# Patient Record
Sex: MALE | Race: BLACK OR AFRICAN AMERICAN | NOT HISPANIC OR LATINO | Employment: STUDENT | URBAN - METROPOLITAN AREA
[De-identification: names, ages, dates, MRNs, and addresses within clinical notes are randomized per-mention and may not be internally consistent; named-entity substitution may affect disease eponyms.]

---

## 2024-07-01 ENCOUNTER — HOSPITAL ENCOUNTER (EMERGENCY)
Facility: HOSPITAL | Age: 24
Discharge: HOME | End: 2024-07-01
Attending: STUDENT IN AN ORGANIZED HEALTH CARE EDUCATION/TRAINING PROGRAM

## 2024-07-01 VITALS
HEART RATE: 80 BPM | WEIGHT: 127 LBS | OXYGEN SATURATION: 99 % | RESPIRATION RATE: 15 BRPM | DIASTOLIC BLOOD PRESSURE: 80 MMHG | TEMPERATURE: 99 F | SYSTOLIC BLOOD PRESSURE: 125 MMHG

## 2024-07-01 DIAGNOSIS — J10.1 INFLUENZA A: Primary | ICD-10-CM

## 2024-07-01 LAB
HCV AB SERPL QL IA: NORMAL
HIV 1+2 AB+HIV1 P24 AG SERPL QL IA: NORMAL
INFLUENZA A, MOLECULAR: POSITIVE
INFLUENZA B, MOLECULAR: NEGATIVE
SARS-COV-2 RDRP RESP QL NAA+PROBE: NEGATIVE
SPECIMEN SOURCE: ABNORMAL

## 2024-07-01 PROCEDURE — 99283 EMERGENCY DEPT VISIT LOW MDM: CPT

## 2024-07-01 PROCEDURE — U0002 COVID-19 LAB TEST NON-CDC: HCPCS | Performed by: EMERGENCY MEDICINE

## 2024-07-01 PROCEDURE — 86803 HEPATITIS C AB TEST: CPT | Performed by: PHYSICIAN ASSISTANT

## 2024-07-01 PROCEDURE — 87389 HIV-1 AG W/HIV-1&-2 AB AG IA: CPT | Performed by: PHYSICIAN ASSISTANT

## 2024-07-01 PROCEDURE — 87502 INFLUENZA DNA AMP PROBE: CPT | Performed by: PHYSICIAN ASSISTANT

## 2024-07-01 PROCEDURE — 80047 BASIC METABLC PNL IONIZED CA: CPT

## 2024-07-01 RX ORDER — OSELTAMIVIR PHOSPHATE 75 MG/1
75 CAPSULE ORAL 2 TIMES DAILY
Qty: 10 CAPSULE | Refills: 0 | Status: SHIPPED | OUTPATIENT
Start: 2024-07-01 | End: 2024-07-06

## 2024-07-01 NOTE — Clinical Note
"Cameron Dueñas" Magy was seen and treated in our emergency department on 7/1/2024.  He may return to work on 07/08/2024.       If you have any questions or concerns, please don't hesitate to call.      Geoff Romero PA-C"

## 2024-07-02 LAB
BUN SERPL-MCNC: 8 MG/DL (ref 6–30)
CHLORIDE SERPL-SCNC: 98 MMOL/L (ref 95–110)
CREAT SERPL-MCNC: 1.1 MG/DL (ref 0.5–1.4)
GLUCOSE SERPL-MCNC: 117 MG/DL (ref 70–110)
HCT VFR BLD CALC: 42 %PCV (ref 36–54)
POC IONIZED CALCIUM: 1.14 MMOL/L (ref 1.06–1.42)
POC TCO2 (MEASURED): 27 MMOL/L (ref 23–29)
POTASSIUM BLD-SCNC: 3.5 MMOL/L (ref 3.5–5.1)
SAMPLE: ABNORMAL
SODIUM BLD-SCNC: 136 MMOL/L (ref 136–145)

## 2024-07-02 NOTE — ED PROVIDER NOTES
Bedside and Verbal shift change report given to CONNOR Ramires (oncoming nurse) by Joanne Ma RN (offgoing nurse). Report included the following information SBAR. Encounter Date: 7/1/2024       History     Chief Complaint   Patient presents with    Generalized Body Aches     C/o body aches, cough, chills, and intermittent BLE tingling. Reports being outside a lot and recently was in a house with no AC.     The history is provided by the patient and medical records. No  was used.     Cameron Bhatti is a 23 y.o. male with no reported medical history presenting to the ED with the chief complaint of generalized body aches.     Awoke today feeling hot and diaphoretic. Reports the AC where he lives is out. Reports associated dry cough. He has chronic lower back pain. He has body aches and paresthesias in his BL thighs. No pain radiating down his legs, extremity weakness, hx of back surgeries, hx of IVDU, b/b dysfunction. No daily medications. Denies headache, vision changes, speech changes, chest pain, SOB, abdominal pain, vomiting. Patient's mother advised him to come to the ED for COVID testing prior to returning to his grandparents house.     Review of patient's allergies indicates:  No Known Allergies  No past medical history on file.  No past surgical history on file.  No family history on file.     Review of Systems   Constitutional:  Positive for fever.   Musculoskeletal:  Positive for back pain and myalgias.       Physical Exam     Initial Vitals [07/01/24 1917]   BP Pulse Resp Temp SpO2   119/73 95 15 98.6 °F (37 °C) 98 %      MAP       --         Physical Exam    Constitutional: He appears well-developed and well-nourished. He is not diaphoretic. He is easily aroused.   HENT:   Head: Normocephalic and atraumatic.   Mouth/Throat: Oropharynx is clear and moist. No oropharyngeal exudate.   Eyes: EOM and lids are normal. Pupils are equal, round, and reactive to light. No scleral icterus.   Neck: Phonation normal. Neck supple. No stridor present.   Normal range of motion.  Cardiovascular:  Normal rate and regular rhythm.           Pulmonary/Chest:  Breath sounds normal. No stridor. No respiratory distress. He has no wheezes. He has no rales.   Abdominal: Abdomen is soft. He exhibits no distension. There is no abdominal tenderness. There is no rebound.   Musculoskeletal:         General: No tenderness or edema. Normal range of motion.      Cervical back: Normal range of motion and neck supple.      Comments: No midline spinal tenderness     Neurological: He is alert, oriented to person, place, and time and easily aroused. He has normal strength. No sensory deficit.   No focal weakness or sensory deficit   Skin: Skin is warm and dry. No rash noted. No erythema.   Psychiatric: He has a normal mood and affect. His speech is normal.       ED Course   Procedures  Labs Reviewed   INFLUENZA A & B BY MOLECULAR - Abnormal; Notable for the following components:       Result Value    Influenza A, Molecular Positive (*)     All other components within normal limits   SARS-COV-2 RNA AMPLIFICATION, QUAL   HIV 1 / 2 ANTIBODY    Narrative:     Release to patient->Immediate   HEPATITIS C ANTIBODY    Narrative:     Release to patient->Immediate   ISTAT CHEM8          Imaging Results    None          Medications - No data to display  Medical Decision Making  23 y.o. male with no reported medical history presenting to the ED c/o subjective fever, chills, myalgias, dry cough beginning earlier today. Reports having paresthesias to both thighs.     DDx includes but not limited to viral syndrome, COVID-19, pneumonia, reactive airway disease, dehydration, GLENN.    Amount and/or Complexity of Data Reviewed  Labs: ordered. Decision-making details documented in ED Course.    Risk  Prescription drug management.               ED Course as of 07/01/24 2254   Mon Jul 01, 2024 2231 Influenza A, Molecular(!): Positive [BA]   2248 Influenza A, Molecular(!): Positive  Tamiflu RX provided. Home management instructions provided. WOrk excuse given. Patient expresses understanding and agreeable to  the plan. Return to ED precautions given for new, worsening, or concerning symptoms. I have discussed the care of this patient with my supervising physician.  [BA]      ED Course User Index  [BA] Geoff Romero PA-C                           Clinical Impression:  Final diagnoses:  [J10.1] Influenza A (Primary)          ED Disposition Condition    Discharge Stable          ED Prescriptions       Medication Sig Dispense Start Date End Date Auth. Provider    oseltamivir (TAMIFLU) 75 MG capsule Take 1 capsule (75 mg total) by mouth 2 (two) times daily. for 5 days 10 capsule 7/1/2024 7/6/2024 Geoff Romero PA-C          Follow-up Information       Follow up With Specialties Details Why Contact Info    Carlos Singh MD Pediatrics   [address] [facility] [address]  [phone]               Geoff Romero PA-C  07/01/24 3533

## 2024-07-02 NOTE — ED NOTES
I-STAT Chem-8+ Results:   Value Reference Range   Sodium 136 136-145 mmol/L   Potassium  3.5 3.5-5.1 mmol/L   Chloride 98  mmol/L   Ionized Calcium 1.14 1.06-1.42 mmol/L   CO2 (measured) 27 23-29 mmol/L   Glucose 117  mg/dL   BUN 8 6-30 mg/dL   Creatinine 1.1 0.5-1.4 mg/dL   Hematocrit 42 36-54%

## 2024-07-02 NOTE — DISCHARGE INSTRUCTIONS
Take the prescribed Tamiflu as discussed  Hydrate by drinking 8-10 glasses of water per day  Take Tylenol and Ibuprofen as discussed for pain and fever    Return to the emergency room for new, worsening, or concerning symptoms.

## 2024-07-02 NOTE — ED TRIAGE NOTES
Pt presents to the ED with c/o generalized body aches, subjective fever/chills, sore throat, cough for the last few days. Pt denies being around any sick family/friends. Pt denies CP, SOB, NVD, abdominal pain.

## 2024-07-02 NOTE — FIRST PROVIDER EVALUATION
Medical screening examination initiated.  I have conducted a focused provider triage encounter, findings are as follows:    Brief history of present illness:  Healthy 24yo M presenting with numbness and tingling in BLE, better with movement. No AC in home. +low back pain, aching. +cough, chills.    Vitals:    07/01/24 1917   BP: 119/73   Pulse: 95   Resp: 15   Temp: 98.6 °F (37 °C)   TempSrc: Oral   SpO2: 98%   Weight: 57.6 kg (127 lb)       Pertinent physical exam:  well appearing, gait steady, NAD    Brief workup plan:  istat chem 8, covid    Preliminary workup initiated; this workup will be continued and followed by the physician or advanced practice provider that is assigned to the patient when roomed.

## 2024-07-02 NOTE — ED NOTES
Patient identifiers for Cameron Bhatti checked and correct.    LOC: The patient is awake, alert and aware of environment with an appropriate affect, the patient is oriented x 4 and speaking appropriately.  APPEARANCE: Patient resting comfortably and in no acute distress, patient is clean and well groomed, patient's clothing is properly fastened.  SKIN: The skin is warm and dry, color consistent with ethnicity, patient has normal skin turgor and moist mucus membranes, skin intact, no breakdown or bruising noted.  MUSCULOSKELETAL: Patient moving all extremities well, no obvious swelling or deformities noted. +Generalized weakness/body aches  RESPIRATORY: Airway is open and patent, respirations are spontaneous and even, patient has a normal effort and rate. +Dry cough  CARDIAC: Patient has a normal rate and rhythm, no periphreal edema noted, capillary refill < 3 seconds.  ABDOMEN: Soft and non tender to palpation, no distention noted. Patient denies any nausea, vomiting, diarrhea, or constipation.   NEUROLOGIC: Eyes open spontaneously, PERRL, behavior appropriate to situation, follows commands, facial expression symmetrical, bilateral hand grasp equal and even, purposeful motor response noted, normal sensation in all extremities.   HEENT: No abnormalities noted. White sclera and pupils equal round and reactive to light. Denies headache, dizziness. +Sore throat  : Pt voids independently, denies dysuria, hematuria, frequency.